# Patient Record
Sex: FEMALE | Race: BLACK OR AFRICAN AMERICAN | NOT HISPANIC OR LATINO | Employment: UNEMPLOYED | URBAN - METROPOLITAN AREA
[De-identification: names, ages, dates, MRNs, and addresses within clinical notes are randomized per-mention and may not be internally consistent; named-entity substitution may affect disease eponyms.]

---

## 2013-04-22 LAB — HIV-1 AND HIV-2 ANTIBODIES: NEGATIVE

## 2021-01-12 PROBLEM — I10 HYPERTENSION: Status: ACTIVE | Noted: 2021-01-12

## 2021-01-12 PROBLEM — E11.9 TYPE 2 DIABETES MELLITUS: Status: ACTIVE | Noted: 2021-01-12

## 2021-01-12 PROBLEM — K21.9 GERD (GASTROESOPHAGEAL REFLUX DISEASE): Status: ACTIVE | Noted: 2021-01-12

## 2021-03-24 PROBLEM — K52.9 CHRONIC DIARRHEA: Status: ACTIVE | Noted: 2021-03-24

## 2021-08-13 PROBLEM — M25.532 PAIN OF BOTH WRIST JOINTS: Status: ACTIVE | Noted: 2021-08-13

## 2021-08-13 PROBLEM — M25.531 PAIN OF BOTH WRIST JOINTS: Status: ACTIVE | Noted: 2021-08-13

## 2021-08-13 PROBLEM — R09.89 CHEST CONGESTION: Status: ACTIVE | Noted: 2021-08-13

## 2021-10-20 PROBLEM — M62.81 MUSCLE WEAKNESS: Status: ACTIVE | Noted: 2021-10-20

## 2021-10-26 PROBLEM — A59.01 TRICHOMONAL VAGINITIS: Status: ACTIVE | Noted: 2021-10-26

## 2022-06-02 PROBLEM — A59.01 TRICHOMONAL VAGINITIS: Status: RESOLVED | Noted: 2021-10-26 | Resolved: 2022-06-02

## 2022-06-04 DIAGNOSIS — U07.1 COVID-19 VIRUS DETECTED: ICD-10-CM

## 2022-06-07 ENCOUNTER — NURSE TRIAGE (OUTPATIENT)
Dept: ADMINISTRATIVE | Facility: CLINIC | Age: 47
End: 2022-06-07
Payer: MEDICAID

## 2022-06-07 NOTE — TELEPHONE ENCOUNTER
[facility]SM coughpt with c/o nausea, diarrhea, and cough.    Pt wants to be seen so pt was advised to call PCP for an appointment. Pt does not have [facility]CP.  Pt verbalized understanding.    Unable to route to MD.    Reason for Disposition   Patient wants to be seen    Additional Information   Negative: Shock suspected (e.g., cold/pale/clammy skin, too weak to stand, low BP, rapid pulse)   Negative: Difficult to awaken or acting confused (e.g., disoriented, slurred speech)   Negative: Sounds like a life-threatening emergency to the triager   Negative: SEVERE abdominal pain (e.g., excruciating) and present > 1 hour   Negative: SEVERE abdominal pain and age > 60 years   Negative: Bloody, black, or tarry bowel movements (Exception: chronic-unchanged black-grey bowel movements and is taking iron pills or Pepto-Bismol)   Negative: SEVERE diarrhea (e.g., 7 or more times / day more than normal) and age > 60 years   Negative: Constant abdominal pain lasting > 2 hours   Negative: Drinking very little and has signs of dehydration (e.g., no urine > 12 hours, very dry mouth, very lightheaded)   Negative: Patient sounds very sick or weak to the triager   Negative: SEVERE diarrhea (e.g., 7 or more times / day more than normal) and present > 24 hours (1 day)   Negative: MODERATE diarrhea (e.g., 4-6 times / day more than normal) and age > 70 years   Negative: MODERATE diarrhea (e.g., 4-6 times / day more than normal) and present > 48 hours (2 days)   Negative: Abdominal pain  (Exception: pain clears completely with each passage of diarrhea stool)   Negative: Fever > 101 F (38.3 C)   Negative: Blood in the stool   Negative: Mucus or pus in stool has been present > 2 days and diarrhea is more than mild   Negative: Weak immune system (e.g., HIV positive, cancer chemo, splenectomy, organ transplant, chronic steroids)   Negative: Travel to a foreign country in past month   Negative: Recent antibiotic therapy (i.e.,  within last 2 months) and diarrhea present > 3 days since antibiotic was stopped   Negative: Recent hospitalization and diarrhea present > 3 days   Negative: Tube feedings (e.g., nasogastric, g-tube, j-tube)   Negative: MILD diarrhea (e.g., 1-3 or more stools than normal in past 24 hours) diarrhea without known cause and present > 7 days    Protocols used: DIARRHEA-A-OH

## 2022-06-09 PROBLEM — E66.01 MORBID OBESITY WITH BMI OF 40.0-44.9, ADULT: Status: ACTIVE | Noted: 2022-06-09

## 2022-07-06 ENCOUNTER — TELEPHONE (OUTPATIENT)
Dept: SMOKING CESSATION | Facility: CLINIC | Age: 47
End: 2022-07-06
Payer: MEDICAID

## 2022-07-11 ENCOUNTER — TELEPHONE (OUTPATIENT)
Dept: SMOKING CESSATION | Facility: CLINIC | Age: 47
End: 2022-07-11
Payer: MEDICAID

## 2022-07-11 ENCOUNTER — CLINICAL SUPPORT (OUTPATIENT)
Dept: SMOKING CESSATION | Facility: CLINIC | Age: 47
End: 2022-07-11
Payer: COMMERCIAL

## 2022-07-11 DIAGNOSIS — F17.200 NICOTINE DEPENDENCE: Primary | ICD-10-CM

## 2022-07-11 PROCEDURE — 99404 PR PREVENT COUNSEL,INDIV,60 MIN: ICD-10-PCS | Mod: S$GLB,,,

## 2022-07-11 PROCEDURE — 99404 PREV MED CNSL INDIV APPRX 60: CPT | Mod: S$GLB,,,

## 2022-07-11 RX ORDER — IBUPROFEN 200 MG
2 TABLET ORAL DAILY
Qty: 28 PATCH | Refills: 0 | Status: SHIPPED | OUTPATIENT
Start: 2022-07-11 | End: 2022-07-27 | Stop reason: SDUPTHER

## 2022-07-11 RX ORDER — DM/P-EPHED/ACETAMINOPH/DOXYLAM 30-7.5/3
2 LIQUID (ML) ORAL
Qty: 168 LOZENGE | Refills: 0 | Status: SHIPPED | OUTPATIENT
Start: 2022-07-11 | End: 2023-09-06 | Stop reason: SDUPTHER

## 2022-07-11 RX ORDER — BUPROPION HYDROCHLORIDE 150 MG/1
150 TABLET, EXTENDED RELEASE ORAL 2 TIMES DAILY
Qty: 60 TABLET | Refills: 11 | Status: SHIPPED | OUTPATIENT
Start: 2022-07-11 | End: 2024-03-20 | Stop reason: SDUPTHER

## 2022-07-14 ENCOUNTER — TELEPHONE (OUTPATIENT)
Dept: SMOKING CESSATION | Facility: CLINIC | Age: 47
End: 2022-07-14
Payer: MEDICAID

## 2022-07-14 NOTE — TELEPHONE ENCOUNTER
Called the pharmacy about rx dosage confusion and called pt and let her know what was the issue was. Patient stated she had already cut back on her smoking and was looking forward to our next session.

## 2022-07-14 NOTE — TELEPHONE ENCOUNTER
Returned a call back message from [facility] in regards to this patient's prescription for nicotine patches. Insurance will only pay for 1 patch per day with a total of 28 patches per month. Counselor notified to contact patient to discuss treatment plan.

## 2022-07-27 ENCOUNTER — CLINICAL SUPPORT (OUTPATIENT)
Dept: SMOKING CESSATION | Facility: CLINIC | Age: 47
End: 2022-07-27
Payer: COMMERCIAL

## 2022-07-27 DIAGNOSIS — F17.200 NICOTINE DEPENDENCE: ICD-10-CM

## 2022-07-27 PROCEDURE — 99403 PR PREVENT COUNSEL,INDIV,45 MIN: ICD-10-PCS | Mod: S$GLB,,,

## 2022-07-27 PROCEDURE — 99403 PREV MED CNSL INDIV APPRX 45: CPT | Mod: S$GLB,,,

## 2022-07-27 RX ORDER — IBUPROFEN 200 MG
2 TABLET ORAL DAILY
Qty: 28 PATCH | Refills: 0 | Status: SHIPPED | OUTPATIENT
Start: 2022-07-27 | End: 2022-09-15 | Stop reason: SDUPTHER

## 2022-07-27 NOTE — PROGRESS NOTES
Individual Follow-Up Form    7/27/2022    Quit Date: TBD    Clinical Status of Patient: Outpatient    Length of Service: 45 minutes    Continuing Medication: yes  Patches or Nicotine Lozenges    Other Medications: none     Target Symptoms: Withdrawal and medication side effects. The following were  rated moderate (3) to severe (4) on TCRS:  · Moderate (3):   · Severe (4):     Comments: Pt is still smoking 15 cpd- 1 ppd and wearing 2 patches but having trouble with one of the patches staying on.  She is hopeful about cutting back even more.  Patient needed more patches ordered also.  She states that she has the most cravings when she is hanging out with her friends.     The patient will continue with  therapy sessions and medication monitoring by CTTS. Prescribed medication management will be by physician.  Reviewed strategies, cues, and triggers. Introduced the negative impact of tobacco on health, the health advantages of discontinuing the use of tobacco, time line improved health changes after a quit, withdrawal issues to expect from nicotine and habit, and ways to achieve the goal of a quit.  The patient denies any abnormal behavioral or mental changes at this time.   Follow up in 1 week.    Diagnosis: F17.200    Next Visit: 1 week

## 2022-08-04 ENCOUNTER — CLINICAL SUPPORT (OUTPATIENT)
Dept: SMOKING CESSATION | Facility: CLINIC | Age: 47
End: 2022-08-04
Payer: COMMERCIAL

## 2022-08-04 DIAGNOSIS — F17.200 NICOTINE DEPENDENCE: ICD-10-CM

## 2022-08-04 PROCEDURE — 99402 PR PREVENT COUNSEL,INDIV,30 MIN: ICD-10-PCS | Mod: S$GLB,,,

## 2022-08-04 PROCEDURE — 99402 PREV MED CNSL INDIV APPRX 30: CPT | Mod: S$GLB,,,

## 2022-08-04 NOTE — PROGRESS NOTES
Individual Follow-Up Form    8/4/2022    Quit Date: TBD    Clinical Status of Patient: Outpatient    Length of Service: 30 minutes    Continuing Medication: yes  Patches or Nicotine Lozenges    Other Medications: none     Target Symptoms: Withdrawal and medication side effects. The following were  rated moderate (3) to severe (4) on TCRS:  · Moderate (3):   · Severe (4):     Comments: Pt is smoking under 1 ppd and had a few stressful days and smoked more but then got back on track. She is using NRT as directed and is happy to not be buying 2 ppd anymore.     The patient will continue with  therapy sessions and medication monitoring by CTTS. Prescribed medication management will be by physician.  Reviewed strategies, controlling environment, cues, triggers, new goals set. Introduced high risk situations with preparation interventions, caffeine similarities with withdrawal issues of habit and nicotine, Alcohol, Understanding urges, cravings, stress and relaxation. Open discussion with intervention discussion.  The patient denies any abnormal behavioral or mental changes at this time.     Follow up in 2 weeks  Diagnosis: F17.200    Next Visit: 2 weeks

## 2022-09-15 ENCOUNTER — CLINICAL SUPPORT (OUTPATIENT)
Dept: SMOKING CESSATION | Facility: CLINIC | Age: 47
End: 2022-09-15
Payer: COMMERCIAL

## 2022-09-15 DIAGNOSIS — F17.200 NICOTINE DEPENDENCE: ICD-10-CM

## 2022-09-15 PROCEDURE — 99402 PREV MED CNSL INDIV APPRX 30: CPT | Mod: S$GLB,,,

## 2022-09-15 PROCEDURE — 99402 PR PREVENT COUNSEL,INDIV,30 MIN: ICD-10-PCS | Mod: S$GLB,,,

## 2022-09-15 RX ORDER — IBUPROFEN 200 MG
2 TABLET ORAL DAILY
Qty: 28 PATCH | Refills: 0 | Status: SHIPPED | OUTPATIENT
Start: 2022-09-15 | End: 2024-03-20 | Stop reason: SDUPTHER

## 2022-09-15 NOTE — PROGRESS NOTES
Pt smokes almost 2 ppd and wants quit for cost of cigarettes and for daugthers. She is confident with the right help she can quit.  She has experience great family tragedy recently, but know she cannot keep smoking. She will start with 2 21mg patches  
Clothing

## 2022-09-15 NOTE — PROGRESS NOTES
Individual Follow-Up Form    9/15/2022    Quit Date: TBD    Clinical Status of Patient: Outpatient    Length of Service: 30 minutes    Continuing Medication: yes  Patches    Other Medications: none     Target Symptoms: Withdrawal and medication side effects. The following were  rated moderate (3) to severe (4) on TCRS:  Moderate (3): none  Severe (4): none    Comments: Pt is smoking 3 ppd and is ready to try and quit smoking again.  She has experienced a lot of loss of death and she is ready to get back to counseling. She will be using 2 21 mg patches to help control cravings. She has lozenges and will start using them. She feels a bit out of control and is ready for help.  The patient will continue with  therapy sessions and medication monitoring by CTTS. Prescribed medication management will be by physician.  completion of TCRS (Tobacco Cessation Rating Scale) reviewed strategies, habitual behavior, stress, and high risk situations. Introduced stress with addition interventions, SOLVE, relaxation with interventions, nutrition, exercise, weight gain, and the importance of rewarding oneself for accomplishments toward becoming tobacco free. Open discussion of all items with interventions.   The patient denies any abnormal behavioral or mental changes at this time.       Diagnosis: F17.200    Next Visit: 1 week

## 2022-09-26 ENCOUNTER — CLINICAL SUPPORT (OUTPATIENT)
Dept: SMOKING CESSATION | Facility: CLINIC | Age: 47
End: 2022-09-26
Payer: COMMERCIAL

## 2022-09-26 DIAGNOSIS — F17.200 NICOTINE DEPENDENCE: Primary | ICD-10-CM

## 2022-09-26 PROCEDURE — 99404 PREV MED CNSL INDIV APPRX 60: CPT | Mod: S$GLB,,,

## 2022-09-26 PROCEDURE — 99404 PR PREVENT COUNSEL,INDIV,60 MIN: ICD-10-PCS | Mod: S$GLB,,,

## 2022-09-26 RX ORDER — ASPIRIN/CALCIUM CARB/MAGNESIUM 325 MG
4 TABLET ORAL
Qty: 168 LOZENGE | Refills: 0 | Status: SHIPPED | OUTPATIENT
Start: 2022-09-26

## 2022-09-26 NOTE — PROGRESS NOTES
Individual Follow-Up Form    9/26/2022    Quit Date: TBD    Clinical Status of Patient: Outpatient    Length of Service: 60 minutes    Continuing Medication: yes  Wellbutrin, Patches, or Nicotine Lozenges    Other Medications: none     Target Symptoms: Withdrawal and medication side effects. The following were  rated moderate (3) to severe (4) on TCRS:  Moderate (3): none  Severe (4): none    Comments: Pt is still smoking but is sporadic according to whether she has money to buy them or not. She is still dealing with and grieving for brother and mother's death but believes she can stay on track to feel better and take care of her  health. She reports not feeling the lozenges working so we will try the 4mg to see if she can continue to cut back on smoking.  The patient will continue with  therapy sessions and medication monitoring by CTTS. Prescribed medication management will be by physician.  completion of TCRS (Tobacco Cessation Rating Scale) reviewed strategies, habitual behavior, high risks situations, understanding urges and cravings, stress and relaxation with open discussion and additional interventions, Introduced lapses, relapses, understanding them and analyzing the situation of a lapse, conflict issues that may be linked to a lapse.   The patient denies any abnormal behavioral or mental changes at this time.       Diagnosis: F17.200    Next Visit: 1 week

## 2022-10-04 ENCOUNTER — CLINICAL SUPPORT (OUTPATIENT)
Dept: SMOKING CESSATION | Facility: CLINIC | Age: 47
End: 2022-10-04
Payer: COMMERCIAL

## 2022-10-04 DIAGNOSIS — F17.200 NICOTINE DEPENDENCE: Primary | ICD-10-CM

## 2022-10-04 PROCEDURE — 99402 PREV MED CNSL INDIV APPRX 30: CPT | Mod: S$GLB,,,

## 2022-10-04 PROCEDURE — 99402 PR PREVENT COUNSEL,INDIV,30 MIN: ICD-10-PCS | Mod: S$GLB,,,

## 2022-10-04 NOTE — PROGRESS NOTES
Individual Follow-Up Form    10/4/2022    Quit Date: TBD    Clinical Status of Patient: Outpatient    Length of Service: 30 minutes    Continuing Medication: yes  Patches or Nicotine Lozenges    Other Medications: none     Target Symptoms: Withdrawal and medication side effects. The following were  rated moderate (3) to severe (4) on TCRS:  Moderate (3): none  Severe (4): none    Comments: Pt is smoking 7-10 cpd and is trying to cut back more.  She is having transportation problems and is hoping to have her friend  her 4mg lozenges to help her reduce her cravings. She is still on track to become smoke free and is hoping the stronger lozenge will work  better.  The patient will continue with  therapy sessions and medication monitoring by CTTS. Prescribed medication management will be by physician.  completion of TCRS (Tobacco Cessation Rating Scale) reviewed strategies, cues, triggers, high risk situations, lapses, relapses, diet, exercise, stress, relaxation, sleep, habitual behavior, and life style changes.  The patient denies any abnormal behavioral or mental changes at this time.       Diagnosis: F17.200    Next Visit: 2 weeks

## 2022-10-18 ENCOUNTER — CLINICAL SUPPORT (OUTPATIENT)
Dept: SMOKING CESSATION | Facility: CLINIC | Age: 47
End: 2022-10-18
Payer: COMMERCIAL

## 2022-10-18 DIAGNOSIS — F17.200 NICOTINE DEPENDENCE: Primary | ICD-10-CM

## 2022-10-18 PROCEDURE — 99403 PREV MED CNSL INDIV APPRX 45: CPT | Mod: S$GLB,,,

## 2022-10-18 PROCEDURE — 99403 PR PREVENT COUNSEL,INDIV,45 MIN: ICD-10-PCS | Mod: S$GLB,,,

## 2022-10-18 NOTE — PROGRESS NOTES
Individual Follow-Up Form    10/18/2022    Quit Date: TBD    Clinical Status of Patient: Outpatient    Length of Service: 45 minutes    Continuing Medication: yes  Patches or Nicotine Lozenges    Other Medications: none     Target Symptoms: Withdrawal and medication side effects. The following were  rated moderate (3) to severe (4) on TCRS:  Moderate (3): none  Severe (4): none    Comments: Pt is smoking 7-10, some days 6 cpd. I encouraged her to use her patches more consistently and to use lozenges.  She stated that the pharmacy did not fill her 4mg so she will try and get that one picked up soon.  She reports using candy to help her not smoke as often. She says that smoking is the only thing that helps calm her when she thinks about her son and mother.  We discussed how holding herself accountable in this process will help her find new ways to cope with stress or sadness.  The patient will continue with  therapy sessions and medication monitoring by CTTS. Prescribed medication management will be by physician.  The patient denies any abnormal behavioral or mental changes at this time.     Diagnosis: F17.200    Next Visit: 2 weeks

## 2022-10-25 ENCOUNTER — CLINICAL SUPPORT (OUTPATIENT)
Dept: SMOKING CESSATION | Facility: CLINIC | Age: 47
End: 2022-10-25
Payer: COMMERCIAL

## 2022-10-25 DIAGNOSIS — F17.200 NICOTINE DEPENDENCE: Primary | ICD-10-CM

## 2022-10-25 PROCEDURE — 99407 BEHAV CHNG SMOKING > 10 MIN: CPT | Mod: S$GLB,,,

## 2022-10-25 PROCEDURE — 99407 PR TOBACCO USE CESSATION INTENSIVE >10 MINUTES: ICD-10-PCS | Mod: S$GLB,,,

## 2022-11-01 ENCOUNTER — CLINICAL SUPPORT (OUTPATIENT)
Dept: SMOKING CESSATION | Facility: CLINIC | Age: 47
End: 2022-11-01
Payer: COMMERCIAL

## 2022-11-01 ENCOUNTER — TELEPHONE (OUTPATIENT)
Dept: SMOKING CESSATION | Facility: CLINIC | Age: 47
End: 2022-11-01
Payer: MEDICAID

## 2022-11-01 DIAGNOSIS — F17.200 NICOTINE DEPENDENCE: Primary | ICD-10-CM

## 2022-11-01 PROCEDURE — 99402 PREV MED CNSL INDIV APPRX 30: CPT | Mod: S$GLB,,,

## 2022-11-01 PROCEDURE — 99402 PR PREVENT COUNSEL,INDIV,30 MIN: ICD-10-PCS | Mod: S$GLB,,,

## 2022-11-01 NOTE — PROGRESS NOTES
Individual Follow-Up Form    11/1/2022    Quit Date: TBD    Clinical Status of Patient: Outpatient    Length of Service: 30 minutes    Continuing Medication: yes  Patches or Nicotine Lozenges    Other Medications: none     Target Symptoms: Withdrawal and medication side effects. The following were  rated moderate (3) to severe (4) on TCRS:  Moderate (3): none  Severe (4): none    Comments: Pt is smoking less and becoming more consistent with thoughts and goals of why she wants to stop smoking. She stated that she will be smoke free going into the new year and looked forward to it. She is having less cravings for cigarettes than last week.   The patient will continue with  therapy sessions and medication monitoring by CTTS. Prescribed medication management will be by physician.  The patient denies any abnormal behavioral or mental changes at this time.       Diagnosis: F17.200    Next Visit: 2 weeks

## 2022-11-15 ENCOUNTER — CLINICAL SUPPORT (OUTPATIENT)
Dept: SMOKING CESSATION | Facility: CLINIC | Age: 47
End: 2022-11-15
Payer: COMMERCIAL

## 2022-11-15 DIAGNOSIS — F17.200 NICOTINE DEPENDENCE: Primary | ICD-10-CM

## 2022-11-15 PROCEDURE — 99402 PREV MED CNSL INDIV APPRX 30: CPT | Mod: S$GLB,,,

## 2022-11-15 PROCEDURE — 99402 PR PREVENT COUNSEL,INDIV,30 MIN: ICD-10-PCS | Mod: S$GLB,,,

## 2022-11-15 NOTE — PROGRESS NOTES
Individual Follow-Up Form    11/15/2022    Quit Date: TBD    Clinical Status of Patient: Outpatient    Length of Service: 30 minutes    Continuing Medication: yes  Nicotine Lozenges    Other Medications: none     Target Symptoms: Withdrawal and medication side effects. The following were  rated moderate (3) to severe (4) on TCRS:  Moderate (3): none  Severe (4): none    Comments: Pt is smoking less than before and reports that she doesn't crave them and if she does smoke then she puts it out quickly bc she doesn't like the smell.  She said she is realizing its time for her to move on from smoking with the possibility of new job and travel with daughter.  She states that she is starting to see that life will not always be stress free, but she has to learn to deal with it without a cigarette. She said that she will be quit by 11/29 for her birthday present to herself.  The patient will continue with  therapy sessions and medication monitoring by CTTS. Prescribed medication management will be by physician.  The patient denies any abnormal behavioral or mental changes at this time.        Diagnosis: F17.200    Next Visit: 2 weeks

## 2022-11-30 ENCOUNTER — CLINICAL SUPPORT (OUTPATIENT)
Dept: SMOKING CESSATION | Facility: CLINIC | Age: 47
End: 2022-11-30
Payer: COMMERCIAL

## 2022-11-30 DIAGNOSIS — F17.200 NICOTINE DEPENDENCE: Primary | ICD-10-CM

## 2022-11-30 PROCEDURE — 99404 PR PREVENT COUNSEL,INDIV,60 MIN: ICD-10-PCS | Mod: S$GLB,,,

## 2022-11-30 PROCEDURE — 99404 PREV MED CNSL INDIV APPRX 60: CPT | Mod: S$GLB,,,

## 2022-11-30 NOTE — PROGRESS NOTES
Individual Follow-Up Form    11/30/2022    Quit Date: TBD    Clinical Status of Patient: Outpatient    Length of Service: 60 minutes    Continuing Medication: yes  Patches or Nicotine Lozenges    Other Medications: none     Target Symptoms: Withdrawal and medication side effects. The following were  rated moderate (3) to severe (4) on TCRS:  Moderate (3): none  Severe (4): none    Comments: Pt is smoke free as of out appt and is marking that as her time she quit smoking.  She is happy and excited and said that she was tired of being tired and sad and that she wanted to live for her kids. She has plans of walking outside and playing with her kids.  She isn't wearing her NRT that she has but has it ready if she needs it.  We talked about being positive and mindful and staying excited about her future.  The patient will continue with  therapy sessions and medication monitoring by CTTS. Prescribed medication management will be by physician.  completion of TCRS (Tobacco Cessation Rating Scale) reviewed strategies, cues, and triggers. Introduced the negative impact of tobacco on health, the health advantages of discontinuing the use of tobacco, time line improved health changes after a quit, withdrawal issues to expect from nicotine and habit, and ways to achieve the goal of a quit.  The patient denies any abnormal behavioral or mental changes at this time.       Diagnosis: F17.200    Next Visit: 1 week

## 2022-12-07 ENCOUNTER — CLINICAL SUPPORT (OUTPATIENT)
Dept: SMOKING CESSATION | Facility: CLINIC | Age: 47
End: 2022-12-07
Payer: COMMERCIAL

## 2022-12-07 DIAGNOSIS — F17.200 NICOTINE DEPENDENCE: Primary | ICD-10-CM

## 2022-12-07 PROCEDURE — 99402 PREV MED CNSL INDIV APPRX 30: CPT | Mod: S$GLB,,,

## 2022-12-07 PROCEDURE — 99402 PR PREVENT COUNSEL,INDIV,30 MIN: ICD-10-PCS | Mod: S$GLB,,,

## 2022-12-07 NOTE — PROGRESS NOTES
Individual Follow-Up Form    12/7/2022    Quit Date: TBD    Clinical Status of Patient: Outpatient    Length of Service: 30 minutes    Continuing Medication: yes  Patches or Nicotine Lozenges    Other Medications: none     Target Symptoms: Withdrawal and medication side effects. The following were  rated moderate (3) to severe (4) on TCRS:  Moderate (3): none  Severe (4): none    Comments: Pt smoked last Friday after an emotional conversation. She was upset that she would have to give her smoke free coin back. It meant a lot to her to be a ble to keep it, but it was an emotional day and the cigarettes were there.  Since that happened she has now been smoke free for 2 days.  She is back on track and hoping to stay smoke free until our clinic visit next week.  The patient will continue with  therapy sessions and medication monitoring by CTTS. Prescribed medication management will be by physician.  completion of TCRS (Tobacco Cessation Rating Scale) reviewed strategies, controlling environment, cues, triggers, new goals set. Introduced high risk situations with preparation interventions, caffeine similarities with withdrawal issues of habit and nicotine, Alcohol, Understanding urges, cravings, stress and relaxation. Open discussion with intervention discussion.  The patient denies any abnormal behavioral or mental changes at this time.     Diagnosis: F17.200    Next Visit: 1 week

## 2023-01-12 ENCOUNTER — TELEPHONE (OUTPATIENT)
Dept: SMOKING CESSATION | Facility: CLINIC | Age: 48
End: 2023-01-12
Payer: MEDICAID

## 2023-02-13 ENCOUNTER — SOCIAL WORK (OUTPATIENT)
Dept: ADMINISTRATIVE | Facility: OTHER | Age: 48
End: 2023-02-13
Payer: MEDICAID

## 2023-02-13 NOTE — PROGRESS NOTES
Sw received a referral to assist with counseling. [facility] had received a consult to see Patient. However, the Clinic does not have a counselor on staff. Sw mailed Patient a list of counselors. She was encouraged to contact one to schedule an assessment if she was still in need of services.    Nicole Verdugo LCSW    [phone]

## 2023-02-15 ENCOUNTER — PATIENT OUTREACH (OUTPATIENT)
Dept: ADMINISTRATIVE | Facility: HOSPITAL | Age: 48
End: 2023-02-15
Payer: MEDICAID

## 2023-02-15 ENCOUNTER — PATIENT MESSAGE (OUTPATIENT)
Dept: ADMINISTRATIVE | Facility: HOSPITAL | Age: 48
End: 2023-02-15
Payer: MEDICAID

## 2023-02-28 PROBLEM — Z12.11 SCREENING FOR COLON CANCER: Status: ACTIVE | Noted: 2023-02-28

## 2023-07-17 ENCOUNTER — TELEPHONE (OUTPATIENT)
Dept: SMOKING CESSATION | Facility: CLINIC | Age: 48
End: 2023-07-17
Payer: MEDICAID

## 2023-08-17 ENCOUNTER — SPECIALTY PHARMACY (OUTPATIENT)
Dept: PHARMACY | Facility: CLINIC | Age: 48
End: 2023-08-17
Payer: MEDICAID

## 2023-09-05 ENCOUNTER — TELEPHONE (OUTPATIENT)
Dept: SMOKING CESSATION | Facility: CLINIC | Age: 48
End: 2023-09-05
Payer: MEDICAID

## 2023-09-06 ENCOUNTER — CLINICAL SUPPORT (OUTPATIENT)
Dept: SMOKING CESSATION | Facility: CLINIC | Age: 48
End: 2023-09-06
Payer: COMMERCIAL

## 2023-09-06 DIAGNOSIS — F17.200 NICOTINE DEPENDENCE: Primary | ICD-10-CM

## 2023-09-06 PROCEDURE — 99404 PREV MED CNSL INDIV APPRX 60: CPT | Mod: S$GLB,,,

## 2023-09-06 PROCEDURE — 99404 PR PREVENT COUNSEL,INDIV,60 MIN: ICD-10-PCS | Mod: S$GLB,,,

## 2023-09-06 RX ORDER — IBUPROFEN 200 MG
1 TABLET ORAL DAILY
Qty: 14 PATCH | Refills: 0 | Status: SHIPPED | OUTPATIENT
Start: 2023-09-06

## 2023-09-06 RX ORDER — DM/P-EPHED/ACETAMINOPH/DOXYLAM 30-7.5/3
2 LIQUID (ML) ORAL
Qty: 168 LOZENGE | Refills: 0 | Status: SHIPPED | OUTPATIENT
Start: 2023-09-06 | End: 2024-03-20 | Stop reason: SDUPTHER

## 2023-09-06 NOTE — PROGRESS NOTES
Pt is smoking 10-1 ppd and wants to quit for her health. She smokes in her home.  She will use 21 mg patch and 2 mg melisa.

## 2023-09-19 ENCOUNTER — TELEPHONE (OUTPATIENT)
Dept: SMOKING CESSATION | Facility: CLINIC | Age: 48
End: 2023-09-19
Payer: MEDICAID

## 2023-12-06 ENCOUNTER — TELEPHONE (OUTPATIENT)
Dept: SMOKING CESSATION | Facility: CLINIC | Age: 48
End: 2023-12-06
Payer: MEDICAID

## 2023-12-06 NOTE — TELEPHONE ENCOUNTER
1st attempt no message regarding smoking cessation quit 1 and 2 episode, not accepting calls at this time.

## 2024-03-04 ENCOUNTER — CLINICAL SUPPORT (OUTPATIENT)
Dept: SMOKING CESSATION | Facility: CLINIC | Age: 49
End: 2024-03-04
Payer: COMMERCIAL

## 2024-03-04 DIAGNOSIS — F17.200 NICOTINE DEPENDENCE: Primary | ICD-10-CM

## 2024-03-04 PROCEDURE — 99407 BEHAV CHNG SMOKING > 10 MIN: CPT | Mod: S$GLB,,,

## 2024-03-04 NOTE — PROGRESS NOTES
Spoke with patient today in regard to smoking cessation progress for 6 month phone follow up on quit 2. Patient not tobacco free at this time.  Patient has scheduled an appointment to return to the program for Quit attempt #3.  Informed patient of benefit period, future follow ups, and contact information if any further help or support is needed.  Will complete smart form on Quit attempt #2.

## 2024-03-19 ENCOUNTER — TELEPHONE (OUTPATIENT)
Dept: SMOKING CESSATION | Facility: CLINIC | Age: 49
End: 2024-03-19
Payer: MEDICAID

## 2024-03-20 ENCOUNTER — CLINICAL SUPPORT (OUTPATIENT)
Dept: SMOKING CESSATION | Facility: CLINIC | Age: 49
End: 2024-03-20
Payer: COMMERCIAL

## 2024-03-20 DIAGNOSIS — F17.200 NICOTINE DEPENDENCE: ICD-10-CM

## 2024-03-20 PROCEDURE — 99404 PREV MED CNSL INDIV APPRX 60: CPT | Mod: S$GLB,,,

## 2024-03-20 RX ORDER — IBUPROFEN 200 MG
2 TABLET ORAL DAILY
Qty: 28 PATCH | Refills: 0 | Status: SHIPPED | OUTPATIENT
Start: 2024-03-20 | End: 2024-04-13 | Stop reason: SDUPTHER

## 2024-03-20 RX ORDER — BUPROPION HYDROCHLORIDE 150 MG/1
150 TABLET, EXTENDED RELEASE ORAL 2 TIMES DAILY
Qty: 60 TABLET | Refills: 3 | Status: SHIPPED | OUTPATIENT
Start: 2024-03-20 | End: 2025-03-20

## 2024-03-20 RX ORDER — BUPROPION HYDROCHLORIDE 150 MG/1
150 TABLET, EXTENDED RELEASE ORAL 2 TIMES DAILY
Qty: 60 TABLET | Refills: 11 | Status: SHIPPED | OUTPATIENT
Start: 2024-03-20 | End: 2024-03-20

## 2024-03-20 RX ORDER — DM/P-EPHED/ACETAMINOPH/DOXYLAM 30-7.5/3
2 LIQUID (ML) ORAL
Qty: 168 LOZENGE | Refills: 0 | Status: SHIPPED | OUTPATIENT
Start: 2024-03-20 | End: 2024-04-13 | Stop reason: SDUPTHER

## 2024-03-20 NOTE — PROGRESS NOTES
Pt smokes 1.5 ppd depending on stress.  Wants to quit to be active with her family.  She will use 2 patches, Wellbutrin and 2mg melisa.  She will stop smoking in her home.

## 2024-04-01 ENCOUNTER — PATIENT OUTREACH (OUTPATIENT)
Dept: ADMINISTRATIVE | Facility: HOSPITAL | Age: 49
End: 2024-04-01
Payer: MEDICAID

## 2024-04-03 ENCOUNTER — PATIENT OUTREACH (OUTPATIENT)
Dept: ADMINISTRATIVE | Facility: HOSPITAL | Age: 49
End: 2024-04-03
Payer: MEDICAID

## 2024-04-03 ENCOUNTER — TELEPHONE (OUTPATIENT)
Dept: SMOKING CESSATION | Facility: CLINIC | Age: 49
End: 2024-04-03
Payer: MEDICAID

## 2024-04-03 DIAGNOSIS — E11.39 TYPE 2 DIABETES MELLITUS WITH OTHER OPHTHALMIC COMPLICATION, WITHOUT LONG-TERM CURRENT USE OF INSULIN: Primary | ICD-10-CM

## 2024-04-10 ENCOUNTER — TELEPHONE (OUTPATIENT)
Dept: SMOKING CESSATION | Facility: CLINIC | Age: 49
End: 2024-04-10
Payer: MEDICAID

## 2024-04-10 ENCOUNTER — CLINICAL SUPPORT (OUTPATIENT)
Dept: SMOKING CESSATION | Facility: CLINIC | Age: 49
End: 2024-04-10
Payer: COMMERCIAL

## 2024-04-10 DIAGNOSIS — F17.200 NICOTINE DEPENDENCE: Primary | ICD-10-CM

## 2024-04-10 PROCEDURE — 99402 PREV MED CNSL INDIV APPRX 30: CPT | Mod: S$GLB,,,

## 2024-04-10 NOTE — PROGRESS NOTES
Individual Follow-Up Form    4/10/2024    Quit Date: TBD    Clinical Status of Patient: Outpatient    Length of Service: 30 minutes    Continuing Medication: yes  Patches or Nicotine Lozenges    Other Medications: none     Target Symptoms: Withdrawal and medication side effects. The following were  rated moderate (3) to severe (4) on TCRS:  Moderate (3): none  Severe (4): none    Comments: Pt is down to 5-10 cpd from 1.5 ppd.  She isn't wearing patches daily and we discussed benefits of using them daily.  She uses lozenges to help with cravings and she states that she is ready to feel better mentally and physically.  The patient will continue with  therapy sessions and medication monitoring by CTTS. Prescribed medication management will be by physician.  Completion of TCRS (Tobacco Cessation Rating Scale) reviewed strategies, cues, and triggers. Introduced the negative impact of tobacco on health, the health advantages of discontinuing the use of tobacco, time line improved health changes after a quit, withdrawal issues to expect from nicotine and habit, and ways to achieve the goal of a quit.  The patient denies any abnormal behavioral or mental changes at this time.       Diagnosis: F17.200    Next Visit: 1 week

## 2024-04-17 ENCOUNTER — CLINICAL SUPPORT (OUTPATIENT)
Dept: SMOKING CESSATION | Facility: CLINIC | Age: 49
End: 2024-04-17
Payer: COMMERCIAL

## 2024-04-17 DIAGNOSIS — F17.200 NICOTINE DEPENDENCE: Primary | ICD-10-CM

## 2024-04-17 RX ORDER — IBUPROFEN 200 MG
1 TABLET ORAL DAILY
Qty: 14 PATCH | Refills: 0 | Status: SHIPPED | OUTPATIENT
Start: 2024-04-17

## 2024-04-17 NOTE — PROGRESS NOTES
Individual Follow-Up Form    4/17/2024    Quit Date: TBD    Clinical Status of Patient: Outpatient    Length of Service: 30 minutes    Continuing Medication: yes  Patches or Nicotine Lozenges    Other Medications: none     Target Symptoms: Withdrawal and medication side effects. The following were  rated moderate (3) to severe (4) on TCRS:  Moderate (3): none  Severe (4): none    Comments: Pt is smoking 5-10 cpd down from 1.5ppp; stayed steady from last session.  She was coughing a lot this morning and I encouraged her to smoke less today so she could feel better in the mornings.  She agreed.  She is trying to cut back more cpd this week.  Will call pharmacy when they open to check on her rx.  The patient will continue with  therapy sessions and medication monitoring by CTTS. Prescribed medication management will be by physician.  completion of TCRS (Tobacco Cessation Rating Scale) reviewed strategies, controlling environment, cues, triggers, new goals set. Introduced high risk situations with preparation interventions, caffeine similarities with withdrawal issues of habit and nicotine, Alcohol, Understanding urges, cravings, stress and relaxation. Open discussion with intervention discussion.  The patient denies any abnormal behavioral or mental changes at this time.       Diagnosis: F17.200    Next Visit: 1 week

## 2024-04-24 PROBLEM — M05.79 RHEUMATOID ARTHRITIS INVOLVING MULTIPLE SITES WITH POSITIVE RHEUMATOID FACTOR: Status: ACTIVE | Noted: 2024-04-24

## 2024-04-25 ENCOUNTER — CLINICAL SUPPORT (OUTPATIENT)
Dept: SMOKING CESSATION | Facility: CLINIC | Age: 49
End: 2024-04-25
Payer: COMMERCIAL

## 2024-04-25 DIAGNOSIS — F17.200 NICOTINE DEPENDENCE: Primary | ICD-10-CM

## 2024-04-25 PROCEDURE — 99402 PREV MED CNSL INDIV APPRX 30: CPT | Mod: S$GLB,,,

## 2024-04-25 NOTE — PROGRESS NOTES
Individual Follow-Up Form    4/25/2024    Quit Date: TBD    Clinical Status of Patient: Outpatient    Length of Service: 30 minutes    Continuing Medication: yes  Wellbutrin or Patches    Other Medications: none     Target Symptoms: Withdrawal and medication side effects. The following were  rated moderate (3) to severe (4) on TCRS:  Moderate (3): none  Severe (4): none    Comments: Pt is smoking 5- 10 cpd and has been working on herself lately and her views of dealing with her family deaths.  She is optimistic to move through those emotions for the betterment of her health.  She is positive about becoming a nonsmoker and feels great about how her 2024 will be.  Her spirituality guides her and has a huge impact on her life and how smoking needs be be taken out of it. She would like to come into clinic for next visit.  The patient will continue with  therapy sessions and medication monitoring by CTTS. Prescribed medication management will be by physician.  Completion of TCRS (Tobacco Cessation Rating Scale) reviewed strategies, habitual behavior, stress, and high risk situations. Introduced stress with addition interventions, SOLVE, relaxation with interventions, nutrition, exercise, weight gain, and the importance of rewarding oneself for accomplishments toward becoming tobacco free. Open discussion of all items with interventions.   The patient denies any abnormal behavioral or mental changes at this time.       Diagnosis: F17.200    Next Visit: 1 week

## 2024-05-02 ENCOUNTER — CLINICAL SUPPORT (OUTPATIENT)
Dept: SMOKING CESSATION | Facility: CLINIC | Age: 49
End: 2024-05-02
Payer: COMMERCIAL

## 2024-05-02 DIAGNOSIS — F17.200 NICOTINE DEPENDENCE: Primary | ICD-10-CM

## 2024-05-02 PROCEDURE — 99402 PREV MED CNSL INDIV APPRX 30: CPT | Mod: S$GLB,,,

## 2024-05-02 NOTE — PROGRESS NOTES
Individual Follow-Up Form    5/2/2024    Quit Date: TBD    Clinical Status of Patient: Outpatient    Length of Service: 30 minutes    Continuing Medication: yes  Patches    Other Medications: none     Target Symptoms: Withdrawal and medication side effects. The following were  rated moderate (3) to severe (4) on TCRS:  Moderate (3): none  Severe (4): none    Comments: Pt is still smoking 5-10 cpd and not sure how many she would smoke the rest of the day.  I encouraged her to take advantage of being sick right now to help her in cpd reduction.  She didn't agree or disagree, just stated that she wasn't sure how much more she would smoke.  She is having difficulty with pharmacy and not getting Rx that I prescribed and she still hasn't gone to pharmacy to talk to them directly like she stated.  The next visit will be in clinic as I advised her.  She agreed and looked at bus schedule and set appt accordingly.  The patient will continue with  therapy sessions and medication monitoring by CTTS. Prescribed medication management will be by physician.]  Completion of TCRS (Tobacco Cessation Rating Scale) reviewed strategies, habitual behavior, high risks situations, understanding urges and cravings, stress and relaxation with open discussion and additional interventions, Introduced lapses, relapses, understanding them and analyzing the situation of a lapse, conflict issues that may be linked to a lapse.   The patient denies any abnormal behavioral or mental changes at this time.       Diagnosis: F17.200    Next Visit: 1 week

## 2024-05-09 ENCOUNTER — CLINICAL SUPPORT (OUTPATIENT)
Dept: SMOKING CESSATION | Facility: CLINIC | Age: 49
End: 2024-05-09
Payer: COMMERCIAL

## 2024-05-09 DIAGNOSIS — F17.200 NICOTINE DEPENDENCE: Primary | ICD-10-CM

## 2024-05-09 PROCEDURE — 99402 PREV MED CNSL INDIV APPRX 30: CPT | Mod: S$GLB,,,

## 2024-05-09 NOTE — PROGRESS NOTES
Individual Follow-Up Form  Pt did not come to clinic due to bus route and weather  5/9/2024    Quit Date: TBD    Clinical Status of Patient: Outpatient    Length of Service: 30 minutes    Continuing Medication: yes  Patches    Other Medications: none     Target Symptoms: Withdrawal and medication side effects. The following were  rated moderate (3) to severe (4) on TCRS:  Moderate (3): none  Severe (4): none    Comments: Pt states that she is smoking 2-10cpd and thinks that she has done well with reduction.  I commended her on efforts and progress.  She is happy with moving at her own speed of reduction.  She is using a friend's rx for patches because she hasn't had a way to get her rx.  The patient will continue with  therapy sessions and medication monitoring by CTTS. Prescribed medication management will be by physician.  completion of TCRS (Tobacco Cessation Rating Scale) reviewed strategies, cues, triggers, high risk situations, lapses, relapses, diet, exercise, stress, relaxation, sleep, habitual behavior, and life style changes.  The patient denies any abnormal behavioral or mental changes at this time.       Diagnosis: F17.200    Next Visit: 1 week

## 2024-07-10 ENCOUNTER — PATIENT OUTREACH (OUTPATIENT)
Dept: ADMINISTRATIVE | Facility: HOSPITAL | Age: 49
End: 2024-07-10
Payer: MEDICAID

## 2024-07-10 DIAGNOSIS — E11.39 TYPE 2 DIABETES MELLITUS WITH OTHER OPHTHALMIC COMPLICATION, WITHOUT LONG-TERM CURRENT USE OF INSULIN: Primary | ICD-10-CM

## 2024-07-22 ENCOUNTER — PATIENT MESSAGE (OUTPATIENT)
Dept: ADMINISTRATIVE | Facility: OTHER | Age: 49
End: 2024-07-22
Payer: MEDICAID

## 2024-07-23 ENCOUNTER — TELEPHONE (OUTPATIENT)
Dept: SMOKING CESSATION | Facility: CLINIC | Age: 49
End: 2024-07-23
Payer: MEDICAID

## 2024-07-31 ENCOUNTER — CLINICAL SUPPORT (OUTPATIENT)
Dept: SMOKING CESSATION | Facility: CLINIC | Age: 49
End: 2024-07-31
Payer: COMMERCIAL

## 2024-07-31 DIAGNOSIS — F17.200 NICOTINE DEPENDENCE: ICD-10-CM

## 2024-07-31 RX ORDER — IBUPROFEN 200 MG
1 TABLET ORAL DAILY
Qty: 14 PATCH | Refills: 0 | Status: SHIPPED | OUTPATIENT
Start: 2024-07-31

## 2024-07-31 RX ORDER — BUPROPION HYDROCHLORIDE 150 MG/1
150 TABLET, EXTENDED RELEASE ORAL 2 TIMES DAILY
Qty: 60 TABLET | Refills: 3 | Status: SHIPPED | OUTPATIENT
Start: 2024-07-31 | End: 2025-07-31

## 2024-08-07 ENCOUNTER — CLINICAL SUPPORT (OUTPATIENT)
Dept: SMOKING CESSATION | Facility: CLINIC | Age: 49
End: 2024-08-07
Payer: COMMERCIAL

## 2024-08-07 ENCOUNTER — TELEPHONE (OUTPATIENT)
Dept: SMOKING CESSATION | Facility: CLINIC | Age: 49
End: 2024-08-07
Payer: MEDICAID

## 2024-08-07 DIAGNOSIS — F17.200 NICOTINE DEPENDENCE: Primary | ICD-10-CM

## 2024-08-07 PROCEDURE — 99402 PREV MED CNSL INDIV APPRX 30: CPT | Mod: ,,,

## 2024-08-15 ENCOUNTER — CLINICAL SUPPORT (OUTPATIENT)
Dept: SMOKING CESSATION | Facility: CLINIC | Age: 49
End: 2024-08-15
Payer: COMMERCIAL

## 2024-08-15 DIAGNOSIS — F17.200 NICOTINE DEPENDENCE: Primary | ICD-10-CM

## 2024-08-15 PROCEDURE — 99403 PREV MED CNSL INDIV APPRX 45: CPT | Mod: S$GLB,,,

## 2024-08-15 NOTE — PROGRESS NOTES
Individual Follow-Up Form    8/15/2024    Quit Date: TBD    Clinical Status of Patient: Outpatient    Length of Service: 45 minutes    Continuing Medication: yes  Wellbutrin or Patches     Target Symptoms: Withdrawal and medication side effects. The following were  rated moderate (3) to severe (4) on TCRS:  Moderate (3): none  Severe (4): none    Comments: Pt is smoking 10-12 cpd and reports that she has a lot of things going on and once they settle down she will be able to focus on quitting smoking.  She doesn't take her Wellbutrin as directed and states she has trouble remembering to take the medicine.  I offered an easy solution of putting it by her toothbrush to help her remember.  She agreed. She did report that she would be quit by her birthday and that this was her year to get her act together.  She states she is using her nicotine patches daily but did not need a refill at this time.  She remains positive about becoming a non smoker in the near future.  .ml12  completion of TCRS (Tobacco Cessation Rating Scale) reviewed strategies, controlling environment, cues, triggers, new goals set. Introduced high risk situations with preparation interventions, caffeine similarities with withdrawal issues of habit and nicotine, Alcohol, Understanding urges, cravings, stress and relaxation. Open discussion with intervention discussion.  The patient denies any abnormal behavioral or mental changes at this time.       Diagnosis: F17.200    Next Visit: 2 weeks

## 2024-08-19 ENCOUNTER — PATIENT MESSAGE (OUTPATIENT)
Dept: ADMINISTRATIVE | Facility: HOSPITAL | Age: 49
End: 2024-08-19
Payer: MEDICAID

## 2024-08-29 ENCOUNTER — TELEPHONE (OUTPATIENT)
Dept: SMOKING CESSATION | Facility: CLINIC | Age: 49
End: 2024-08-29
Payer: MEDICAID

## 2024-09-12 ENCOUNTER — CLINICAL SUPPORT (OUTPATIENT)
Dept: SMOKING CESSATION | Facility: CLINIC | Age: 49
End: 2024-09-12
Payer: COMMERCIAL

## 2024-09-12 DIAGNOSIS — F17.200 NICOTINE DEPENDENCE: Primary | ICD-10-CM

## 2024-09-12 PROCEDURE — 99404 PREV MED CNSL INDIV APPRX 60: CPT | Mod: S$GLB,,,

## 2024-09-12 NOTE — PROGRESS NOTES
Individual Follow-Up Form    9/12/2024    Quit Date: TBD    Clinical Status of Patient: Outpatient    Length of Service: 60 minutes    Continuing Medication: no       Target Symptoms: Withdrawal and medication side effects. The following were  rated moderate (3) to severe (4) on TCRS:  Moderate (3): none  Severe (4): none    Comments: Pt is smoking 2-4 cpd due to recent illness.  She is having more flareups with her RA and I advised her about smoking and how it would reduce her inflammation if she quit smoking.  She agreed and will be attempting a stronger method to stop smoking.  The patient will continue with  therapy sessions and medication monitoring by CTTS. Prescribed medication management will be by physician.  Completion of TCRS (Tobacco Cessation Rating Scale) reviewed strategies, habitual behavior, stress, and high risk situations. Introduced stress with addition interventions, SOLVE, relaxation with interventions, nutrition, exercise, weight gain, and the importance of rewarding oneself for accomplishments toward becoming tobacco free. Open discussion of all items with interventions.   The patient denies any abnormal behavioral or mental changes at this time.       Diagnosis: F17.200    Next Visit: 1 week

## 2024-09-19 ENCOUNTER — CLINICAL SUPPORT (OUTPATIENT)
Dept: SMOKING CESSATION | Facility: CLINIC | Age: 49
End: 2024-09-19
Payer: COMMERCIAL

## 2024-09-19 DIAGNOSIS — F17.200 NICOTINE DEPENDENCE: Primary | ICD-10-CM

## 2024-09-19 PROCEDURE — 99403 PREV MED CNSL INDIV APPRX 45: CPT | Mod: S$GLB,,,

## 2024-09-19 NOTE — PROGRESS NOTES
Individual Follow-Up Form    9/19/2024    Quit Date: TBD    Clinical Status of Patient: Outpatient    Length of Service: 45 minutes    Continuing Medication: no       Target Symptoms: Withdrawal and medication side effects. The following were  rated moderate (3) to severe (4) on TCRS:  Moderate (3): none  Severe (4): none    Comments: Pt is down to 2-3 cpd from 2-4 cpd.  Pt is ready to be a nonsmoker and said that she was not smoking anymore today.  We discussed ways to reduce her stress.  The patient will continue with  therapy sessions and medication monitoring by CTTS. Prescribed medication management will be by physician.  Completion of TCRS (Tobacco Cessation Rating Scale) reviewed strategies, habitual behavior, high risks situations, understanding urges and cravings, stress and relaxation with open discussion and additional interventions, Introduced lapses, relapses, understanding them and analyzing the situation of a lapse, conflict issues that may be linked to a lapse.   The patient denies any abnormal behavioral or mental changes at this time.       Diagnosis: F17.200    Next Visit: 2 weeks

## 2024-09-25 ENCOUNTER — TELEPHONE (OUTPATIENT)
Dept: SMOKING CESSATION | Facility: CLINIC | Age: 49
End: 2024-09-25
Payer: MEDICAID

## 2024-09-25 NOTE — TELEPHONE ENCOUNTER
Spoke with patient today in regard to smoking cessation progress for 6 month phone follow up on Quit 3.  Patient not tobacco free at this time but stated that she is close to Quitting.  Commended patient on their progress thus far.  Patient currently enrolled in program for Quit 4 and has an appointment scheduled with her CTTS.   Informed patient of benefit period, future follow ups, and contact information if any further help or support is needed.  Quit 3 had been deleted.  Quit 3 was recreated, completed, and resolved.

## 2024-10-02 ENCOUNTER — CLINICAL SUPPORT (OUTPATIENT)
Dept: SMOKING CESSATION | Facility: CLINIC | Age: 49
End: 2024-10-02
Payer: COMMERCIAL

## 2024-10-02 DIAGNOSIS — F17.200 NICOTINE DEPENDENCE: ICD-10-CM

## 2024-10-02 PROCEDURE — 99403 PREV MED CNSL INDIV APPRX 45: CPT | Mod: S$GLB,,,

## 2024-10-02 RX ORDER — IBUPROFEN 200 MG
1 TABLET ORAL DAILY
Qty: 14 PATCH | Refills: 0 | Status: SHIPPED | OUTPATIENT
Start: 2024-10-02

## 2024-10-02 RX ORDER — DM/P-EPHED/ACETAMINOPH/DOXYLAM 30-7.5/3
2 LIQUID (ML) ORAL
Qty: 168 LOZENGE | Refills: 0 | Status: SHIPPED | OUTPATIENT
Start: 2024-10-02

## 2024-10-02 NOTE — PROGRESS NOTES
Individual Follow-Up Form    10/2/2024    Quit Date: TBD    Clinical Status of Patient: Outpatient    Length of Service: 45 minutes    Continuing Medication: yes  Patches or Nicotine Lozenges       Target Symptoms: Withdrawal and medication side effects. The following were  rated moderate (3) to severe (4) on TCRS:  Moderate (3): none  Severe (4): none    Comments: Pt is smoking a few puffs as of yesterday and 3 days smoke free before that.  Pt. In trying to not smoke any longer and feels confident she can take it day by day.  I encouraged smoking cessation with patches and using lozenges to help with cravings.  The patient will continue with  therapy sessions and medication monitoring by CTTS. Prescribed medication management will be by physician.  completion of TCRS (Tobacco Cessation Rating Scale) reviewed strategies, cues, triggers, high risk situations, lapses, relapses, diet, exercise, stress, relaxation, sleep, habitual behavior, and life style changes.  The patient denies any abnormal behavioral or mental changes at this time.       Diagnosis: F17.200    Next Visit: 2 weeks

## 2024-10-10 ENCOUNTER — TELEPHONE (OUTPATIENT)
Dept: SMOKING CESSATION | Facility: CLINIC | Age: 49
End: 2024-10-10
Payer: MEDICAID

## 2024-10-16 ENCOUNTER — CLINICAL SUPPORT (OUTPATIENT)
Dept: SMOKING CESSATION | Facility: CLINIC | Age: 49
End: 2024-10-16
Payer: COMMERCIAL

## 2024-10-16 DIAGNOSIS — F17.200 NICOTINE DEPENDENCE: Primary | ICD-10-CM

## 2024-10-16 NOTE — PROGRESS NOTES
Individual Follow-Up Form    10/16/2024    Quit Date: TBD    Clinical Status of Patient: Outpatient    Length of Service: 45 minutes    Continuing Medication: yes  Wellbutrin, Patches, or Nicotine Lozenges       Target Symptoms: Withdrawal and medication side effects. The following were  rated moderate (3) to severe (4) on TCRS:  Moderate (3): none  Severe (4): none    Comments: Pt is smoking about 7 cpd and more mindful of her upcoming November quit day.  She is cutting down the best she can and is determined to be quit by end of November.  She is using NRT as directed and consistently.   The patient will continue with  therapy sessions and medication monitoring by CTTS. Prescribed medication management will be by physician.  The patient denies any abnormal behavioral or mental changes at this time.       Diagnosis: F17.200    Next Visit: 2 weeks

## 2024-10-24 ENCOUNTER — TELEPHONE (OUTPATIENT)
Dept: SMOKING CESSATION | Facility: CLINIC | Age: 49
End: 2024-10-24
Payer: MEDICAID

## 2024-10-30 ENCOUNTER — CLINICAL SUPPORT (OUTPATIENT)
Dept: SMOKING CESSATION | Facility: CLINIC | Age: 49
End: 2024-10-30
Payer: COMMERCIAL

## 2024-10-30 DIAGNOSIS — F17.200 NICOTINE DEPENDENCE: Primary | ICD-10-CM

## 2024-10-30 PROCEDURE — 99402 PREV MED CNSL INDIV APPRX 30: CPT | Mod: S$GLB,,,

## 2025-04-22 ENCOUNTER — PATIENT MESSAGE (OUTPATIENT)
Dept: ADMINISTRATIVE | Facility: OTHER | Age: 50
End: 2025-04-22
Payer: MEDICAID

## 2025-04-22 DIAGNOSIS — F17.200 NICOTINE DEPENDENCE: ICD-10-CM

## 2025-04-22 RX ORDER — BUPROPION HYDROCHLORIDE 150 MG/1
150 TABLET, EXTENDED RELEASE ORAL 2 TIMES DAILY
Qty: 60 TABLET | Refills: 3 | Status: SHIPPED | OUTPATIENT
Start: 2025-04-22 | End: 2026-04-22

## 2025-07-09 ENCOUNTER — TELEPHONE (OUTPATIENT)
Dept: SMOKING CESSATION | Facility: CLINIC | Age: 50
End: 2025-07-09
Payer: MEDICAID

## 2025-07-09 NOTE — TELEPHONE ENCOUNTER
1st attempt left message regarding smoking cessation quit 4 episode 3/6/12 month follow up.  Episode resolved.  Voice message includes name and contact information.